# Patient Record
Sex: FEMALE | Race: WHITE | HISPANIC OR LATINO
[De-identification: names, ages, dates, MRNs, and addresses within clinical notes are randomized per-mention and may not be internally consistent; named-entity substitution may affect disease eponyms.]

---

## 2018-10-17 ENCOUNTER — APPOINTMENT (OUTPATIENT)
Dept: INTERNAL MEDICINE | Facility: CLINIC | Age: 68
End: 2018-10-17
Payer: MEDICARE

## 2018-10-17 VITALS
HEART RATE: 84 BPM | TEMPERATURE: 98.2 F | SYSTOLIC BLOOD PRESSURE: 124 MMHG | RESPIRATION RATE: 16 BRPM | WEIGHT: 142 LBS | OXYGEN SATURATION: 96 % | HEIGHT: 60 IN | DIASTOLIC BLOOD PRESSURE: 80 MMHG | BODY MASS INDEX: 27.88 KG/M2

## 2018-10-17 DIAGNOSIS — I10 ESSENTIAL (PRIMARY) HYPERTENSION: ICD-10-CM

## 2018-10-17 DIAGNOSIS — E03.9 HYPOTHYROIDISM, UNSPECIFIED: ICD-10-CM

## 2018-10-17 DIAGNOSIS — E78.00 PURE HYPERCHOLESTEROLEMIA, UNSPECIFIED: ICD-10-CM

## 2018-10-17 DIAGNOSIS — R06.2 WHEEZING: ICD-10-CM

## 2018-10-17 PROCEDURE — 94729 DIFFUSING CAPACITY: CPT

## 2018-10-17 PROCEDURE — 99204 OFFICE O/P NEW MOD 45 MIN: CPT | Mod: 25

## 2018-10-17 PROCEDURE — 94727 GAS DIL/WSHOT DETER LNG VOL: CPT

## 2018-10-17 PROCEDURE — ZZZZZ: CPT

## 2018-10-17 PROCEDURE — 94060 EVALUATION OF WHEEZING: CPT

## 2018-10-17 NOTE — HEALTH RISK ASSESSMENT
[] : No [0] : 2) Feeling down, depressed, or hopeless: Not at all (0) [None] : None [Smoke Detector] : smoke detector [Carbon Monoxide Detector] : carbon monoxide detector [Safety elements used in home] : safety elements used in home [Seat Belt] :  uses seat belt [Sunscreen] : uses sunscreen [ColonoscopyDate] : 10/17/17

## 2018-10-22 ENCOUNTER — FORM ENCOUNTER (OUTPATIENT)
Age: 68
End: 2018-10-22

## 2018-10-23 ENCOUNTER — OUTPATIENT (OUTPATIENT)
Dept: OUTPATIENT SERVICES | Facility: HOSPITAL | Age: 68
LOS: 1 days | End: 2018-10-23
Payer: MEDICARE

## 2018-10-23 ENCOUNTER — APPOINTMENT (OUTPATIENT)
Dept: CT IMAGING | Facility: CLINIC | Age: 68
End: 2018-10-23
Payer: MEDICARE

## 2018-10-23 DIAGNOSIS — Z00.8 ENCOUNTER FOR OTHER GENERAL EXAMINATION: ICD-10-CM

## 2018-10-23 PROCEDURE — 71250 CT THORAX DX C-: CPT | Mod: 26

## 2018-10-23 PROCEDURE — 71250 CT THORAX DX C-: CPT

## 2018-11-01 ENCOUNTER — APPOINTMENT (OUTPATIENT)
Dept: INTERNAL MEDICINE | Facility: CLINIC | Age: 68
End: 2018-11-01
Payer: MEDICARE

## 2018-11-01 VITALS
TEMPERATURE: 99.7 F | BODY MASS INDEX: 28.47 KG/M2 | RESPIRATION RATE: 18 BRPM | DIASTOLIC BLOOD PRESSURE: 82 MMHG | OXYGEN SATURATION: 96 % | HEIGHT: 60 IN | SYSTOLIC BLOOD PRESSURE: 120 MMHG | HEART RATE: 72 BPM | WEIGHT: 145 LBS

## 2018-11-01 DIAGNOSIS — F17.200 NICOTINE DEPENDENCE, UNSPECIFIED, UNCOMPLICATED: ICD-10-CM

## 2018-11-01 DIAGNOSIS — J44.9 CHRONIC OBSTRUCTIVE PULMONARY DISEASE, UNSPECIFIED: ICD-10-CM

## 2018-11-01 PROCEDURE — 99214 OFFICE O/P EST MOD 30 MIN: CPT | Mod: 25

## 2018-11-01 PROCEDURE — 99406 BEHAV CHNG SMOKING 3-10 MIN: CPT

## 2018-11-01 RX ORDER — ROSUVASTATIN CALCIUM 5 MG/1
5 TABLET, FILM COATED ORAL
Refills: 0 | Status: ACTIVE | COMMUNITY

## 2018-11-01 RX ORDER — OLMESARTAN MEDOXOMIL AND HYDROCHLOROTHIAZIDE 40; 25 MG/1; MG/1
40-25 TABLET ORAL
Refills: 0 | Status: ACTIVE | COMMUNITY

## 2018-11-01 RX ORDER — ALBUTEROL SULFATE 90 UG/1
108 (90 BASE) POWDER, METERED RESPIRATORY (INHALATION)
Refills: 0 | Status: DISCONTINUED | COMMUNITY
Start: 2018-10-17 | End: 2018-11-01

## 2018-11-01 RX ORDER — LEVOTHYROXINE SODIUM 0.09 MG/1
88 TABLET ORAL
Refills: 0 | Status: ACTIVE | COMMUNITY

## 2018-11-01 NOTE — ASSESSMENT
[FreeTextEntry1] : #1 history of smoking pipe in the past. Patient was counseled to stop smoking altogether.\par \par #2 clear lungs on recent screening CT scan of chest.\par \par #3 incidentally noted some calcification of coronary arteries and aorta. Patient to see Dr. Salmon her cardiologist soon for a following.\par \par RV pulm appt in 1 year.

## 2018-11-01 NOTE — REVIEW OF SYSTEMS
[Fever] : no fever [Chills] : no chills [Recent Change In Weight] : ~T no recent weight change [Chest Pain] : no chest pain [Palpitations] : no palpitations

## 2018-11-01 NOTE — HISTORY OF PRESENT ILLNESS
[FreeTextEntry1] : f/u after CT chest [de-identified] : This is a pleasant 68-year-old female who returns for a following appointment. Past history of smoking pot.  currently she is not having coughing, wheezing, or dyspnea on exertion.  she is not needing to use her Proair her rescue inhaler recently.  she had a screening CT scan of chest that showed clear lungs, incidentally noted some calcifications of the coronary arteries and the aorta and she will continue to follow Dr. Salmon, her cardiologist closely regarding this.\par \par She is not having any chest pain, or palpitations.\par \par She denies recent fever, chills, weight loss.

## 2021-01-07 ENCOUNTER — OUTPATIENT (OUTPATIENT)
Dept: OUTPATIENT SERVICES | Facility: HOSPITAL | Age: 71
LOS: 1 days | End: 2021-01-07
Payer: MEDICARE

## 2021-01-07 DIAGNOSIS — Z20.828 CONTACT WITH AND (SUSPECTED) EXPOSURE TO OTHER VIRAL COMMUNICABLE DISEASES: ICD-10-CM

## 2021-01-07 LAB — SARS-COV-2 RNA SPEC QL NAA+PROBE: SIGNIFICANT CHANGE UP

## 2021-01-07 PROCEDURE — C9803: CPT

## 2021-01-07 PROCEDURE — U0005: CPT

## 2021-01-07 PROCEDURE — U0003: CPT

## 2021-01-08 DIAGNOSIS — Z20.828 CONTACT WITH AND (SUSPECTED) EXPOSURE TO OTHER VIRAL COMMUNICABLE DISEASES: ICD-10-CM

## 2021-02-15 ENCOUNTER — OUTPATIENT (OUTPATIENT)
Dept: OUTPATIENT SERVICES | Facility: HOSPITAL | Age: 71
LOS: 1 days | End: 2021-02-15
Payer: MEDICARE

## 2021-02-15 DIAGNOSIS — Z20.828 CONTACT WITH AND (SUSPECTED) EXPOSURE TO OTHER VIRAL COMMUNICABLE DISEASES: ICD-10-CM

## 2021-02-15 LAB — SARS-COV-2 RNA SPEC QL NAA+PROBE: SIGNIFICANT CHANGE UP

## 2021-02-15 PROCEDURE — U0005: CPT

## 2021-02-15 PROCEDURE — U0003: CPT

## 2021-02-15 PROCEDURE — C9803: CPT

## 2021-02-16 DIAGNOSIS — Z20.828 CONTACT WITH AND (SUSPECTED) EXPOSURE TO OTHER VIRAL COMMUNICABLE DISEASES: ICD-10-CM

## 2021-10-01 ENCOUNTER — TRANSCRIPTION ENCOUNTER (OUTPATIENT)
Age: 71
End: 2021-10-01

## 2021-10-06 PROBLEM — I10 ESSENTIAL HYPERTENSION: Status: ACTIVE | Noted: 2018-10-17

## 2022-05-11 ENCOUNTER — INPATIENT (INPATIENT)
Facility: HOSPITAL | Age: 72
LOS: 0 days | Discharge: HOME | DRG: 305 | End: 2022-05-12
Attending: HOSPITALIST | Admitting: FAMILY MEDICINE
Payer: MEDICARE

## 2022-05-11 VITALS — WEIGHT: 199.96 LBS

## 2022-05-11 DIAGNOSIS — I10 ESSENTIAL (PRIMARY) HYPERTENSION: ICD-10-CM

## 2022-05-11 LAB
ALBUMIN SERPL ELPH-MCNC: 3.8 G/DL — SIGNIFICANT CHANGE UP (ref 3.3–5)
ALP SERPL-CCNC: 86 U/L — SIGNIFICANT CHANGE UP (ref 40–120)
ALT FLD-CCNC: 62 U/L — SIGNIFICANT CHANGE UP (ref 12–78)
ANION GAP SERPL CALC-SCNC: 6 MMOL/L — SIGNIFICANT CHANGE UP (ref 5–17)
APTT BLD: 28.3 SEC — SIGNIFICANT CHANGE UP (ref 27.5–35.5)
AST SERPL-CCNC: 26 U/L — SIGNIFICANT CHANGE UP (ref 15–37)
BASOPHILS # BLD AUTO: 0.05 K/UL — SIGNIFICANT CHANGE UP (ref 0–0.2)
BASOPHILS NFR BLD AUTO: 0.8 % — SIGNIFICANT CHANGE UP (ref 0–2)
BILIRUB SERPL-MCNC: 0.4 MG/DL — SIGNIFICANT CHANGE UP (ref 0.2–1.2)
BUN SERPL-MCNC: 17 MG/DL — SIGNIFICANT CHANGE UP (ref 7–23)
CALCIUM SERPL-MCNC: 9.1 MG/DL — SIGNIFICANT CHANGE UP (ref 8.5–10.1)
CHLORIDE SERPL-SCNC: 106 MMOL/L — SIGNIFICANT CHANGE UP (ref 96–108)
CO2 SERPL-SCNC: 26 MMOL/L — SIGNIFICANT CHANGE UP (ref 22–31)
CREAT SERPL-MCNC: 0.96 MG/DL — SIGNIFICANT CHANGE UP (ref 0.5–1.3)
EGFR: 62 ML/MIN/1.73M2 — SIGNIFICANT CHANGE UP
EOSINOPHIL # BLD AUTO: 0.14 K/UL — SIGNIFICANT CHANGE UP (ref 0–0.5)
EOSINOPHIL NFR BLD AUTO: 2.4 % — SIGNIFICANT CHANGE UP (ref 0–6)
GLUCOSE SERPL-MCNC: 115 MG/DL — HIGH (ref 70–99)
HCT VFR BLD CALC: 43.4 % — SIGNIFICANT CHANGE UP (ref 34.5–45)
HGB BLD-MCNC: 14.2 G/DL — SIGNIFICANT CHANGE UP (ref 11.5–15.5)
IMM GRANULOCYTES NFR BLD AUTO: 0.3 % — SIGNIFICANT CHANGE UP (ref 0–1.5)
INR BLD: 0.97 RATIO — SIGNIFICANT CHANGE UP (ref 0.88–1.16)
LYMPHOCYTES # BLD AUTO: 1.39 K/UL — SIGNIFICANT CHANGE UP (ref 1–3.3)
LYMPHOCYTES # BLD AUTO: 23.5 % — SIGNIFICANT CHANGE UP (ref 13–44)
MCHC RBC-ENTMCNC: 31.1 PG — SIGNIFICANT CHANGE UP (ref 27–34)
MCHC RBC-ENTMCNC: 32.7 GM/DL — SIGNIFICANT CHANGE UP (ref 32–36)
MCV RBC AUTO: 95 FL — SIGNIFICANT CHANGE UP (ref 80–100)
MONOCYTES # BLD AUTO: 0.26 K/UL — SIGNIFICANT CHANGE UP (ref 0–0.9)
MONOCYTES NFR BLD AUTO: 4.4 % — SIGNIFICANT CHANGE UP (ref 2–14)
NEUTROPHILS # BLD AUTO: 4.05 K/UL — SIGNIFICANT CHANGE UP (ref 1.8–7.4)
NEUTROPHILS NFR BLD AUTO: 68.6 % — SIGNIFICANT CHANGE UP (ref 43–77)
PLATELET # BLD AUTO: 237 K/UL — SIGNIFICANT CHANGE UP (ref 150–400)
POTASSIUM SERPL-MCNC: 3.8 MMOL/L — SIGNIFICANT CHANGE UP (ref 3.5–5.3)
POTASSIUM SERPL-SCNC: 3.8 MMOL/L — SIGNIFICANT CHANGE UP (ref 3.5–5.3)
PROT SERPL-MCNC: 7.9 GM/DL — SIGNIFICANT CHANGE UP (ref 6–8.3)
PROTHROM AB SERPL-ACNC: 11.2 SEC — SIGNIFICANT CHANGE UP (ref 10.5–13.4)
RBC # BLD: 4.57 M/UL — SIGNIFICANT CHANGE UP (ref 3.8–5.2)
RBC # FLD: 12.6 % — SIGNIFICANT CHANGE UP (ref 10.3–14.5)
SARS-COV-2 RNA SPEC QL NAA+PROBE: SIGNIFICANT CHANGE UP
SODIUM SERPL-SCNC: 138 MMOL/L — SIGNIFICANT CHANGE UP (ref 135–145)
TROPONIN I, HIGH SENSITIVITY RESULT: 5.08 NG/L — SIGNIFICANT CHANGE UP
TROPONIN I, HIGH SENSITIVITY RESULT: 7.29 NG/L — SIGNIFICANT CHANGE UP
TSH SERPL-MCNC: 1.91 UU/ML — SIGNIFICANT CHANGE UP (ref 0.34–4.82)
WBC # BLD: 5.91 K/UL — SIGNIFICANT CHANGE UP (ref 3.8–10.5)
WBC # FLD AUTO: 5.91 K/UL — SIGNIFICANT CHANGE UP (ref 3.8–10.5)

## 2022-05-11 PROCEDURE — 99223 1ST HOSP IP/OBS HIGH 75: CPT

## 2022-05-11 PROCEDURE — 86803 HEPATITIS C AB TEST: CPT

## 2022-05-11 PROCEDURE — 70450 CT HEAD/BRAIN W/O DYE: CPT | Mod: 26,MA

## 2022-05-11 PROCEDURE — 84443 ASSAY THYROID STIM HORMONE: CPT

## 2022-05-11 PROCEDURE — 93306 TTE W/DOPPLER COMPLETE: CPT

## 2022-05-11 PROCEDURE — 80061 LIPID PANEL: CPT

## 2022-05-11 PROCEDURE — 93005 ELECTROCARDIOGRAM TRACING: CPT

## 2022-05-11 PROCEDURE — 83036 HEMOGLOBIN GLYCOSYLATED A1C: CPT

## 2022-05-11 PROCEDURE — 84484 ASSAY OF TROPONIN QUANT: CPT

## 2022-05-11 PROCEDURE — 80053 COMPREHEN METABOLIC PANEL: CPT

## 2022-05-11 PROCEDURE — 36415 COLL VENOUS BLD VENIPUNCTURE: CPT

## 2022-05-11 PROCEDURE — 71046 X-RAY EXAM CHEST 2 VIEWS: CPT

## 2022-05-11 PROCEDURE — 85027 COMPLETE CBC AUTOMATED: CPT

## 2022-05-11 PROCEDURE — 93010 ELECTROCARDIOGRAM REPORT: CPT

## 2022-05-11 PROCEDURE — 99285 EMERGENCY DEPT VISIT HI MDM: CPT

## 2022-05-11 PROCEDURE — 93306 TTE W/DOPPLER COMPLETE: CPT | Mod: 26

## 2022-05-11 RX ORDER — ONDANSETRON 8 MG/1
4 TABLET, FILM COATED ORAL EVERY 8 HOURS
Refills: 0 | Status: DISCONTINUED | OUTPATIENT
Start: 2022-05-11 | End: 2022-05-12

## 2022-05-11 RX ORDER — LABETALOL HCL 100 MG
5 TABLET ORAL ONCE
Refills: 0 | Status: COMPLETED | OUTPATIENT
Start: 2022-05-11 | End: 2022-05-11

## 2022-05-11 RX ORDER — LEVOTHYROXINE SODIUM 125 MCG
88 TABLET ORAL DAILY
Refills: 0 | Status: DISCONTINUED | OUTPATIENT
Start: 2022-05-11 | End: 2022-05-12

## 2022-05-11 RX ORDER — LOSARTAN POTASSIUM 100 MG/1
100 TABLET, FILM COATED ORAL DAILY
Refills: 0 | Status: DISCONTINUED | OUTPATIENT
Start: 2022-05-11 | End: 2022-05-12

## 2022-05-11 RX ORDER — AMLODIPINE BESYLATE 2.5 MG/1
5 TABLET ORAL DAILY
Refills: 0 | Status: DISCONTINUED | OUTPATIENT
Start: 2022-05-11 | End: 2022-05-12

## 2022-05-11 RX ORDER — ATORVASTATIN CALCIUM 80 MG/1
20 TABLET, FILM COATED ORAL AT BEDTIME
Refills: 0 | Status: DISCONTINUED | OUTPATIENT
Start: 2022-05-11 | End: 2022-05-12

## 2022-05-11 RX ORDER — HYDRALAZINE HCL 50 MG
10 TABLET ORAL EVERY 6 HOURS
Refills: 0 | Status: DISCONTINUED | OUTPATIENT
Start: 2022-05-11 | End: 2022-05-12

## 2022-05-11 RX ORDER — ACETAMINOPHEN 500 MG
650 TABLET ORAL EVERY 6 HOURS
Refills: 0 | Status: DISCONTINUED | OUTPATIENT
Start: 2022-05-11 | End: 2022-05-12

## 2022-05-11 RX ORDER — LANOLIN ALCOHOL/MO/W.PET/CERES
3 CREAM (GRAM) TOPICAL AT BEDTIME
Refills: 0 | Status: DISCONTINUED | OUTPATIENT
Start: 2022-05-11 | End: 2022-05-12

## 2022-05-11 RX ADMIN — LOSARTAN POTASSIUM 100 MILLIGRAM(S): 100 TABLET, FILM COATED ORAL at 13:19

## 2022-05-11 RX ADMIN — AMLODIPINE BESYLATE 5 MILLIGRAM(S): 2.5 TABLET ORAL at 13:19

## 2022-05-11 RX ADMIN — Medication 650 MILLIGRAM(S): at 19:50

## 2022-05-11 RX ADMIN — ATORVASTATIN CALCIUM 20 MILLIGRAM(S): 80 TABLET, FILM COATED ORAL at 21:07

## 2022-05-11 RX ADMIN — Medication 650 MILLIGRAM(S): at 13:19

## 2022-05-11 RX ADMIN — Medication 88 MICROGRAM(S): at 13:20

## 2022-05-11 RX ADMIN — Medication 650 MILLIGRAM(S): at 20:20

## 2022-05-11 RX ADMIN — Medication 5 MILLIGRAM(S): at 11:15

## 2022-05-11 NOTE — PHARMACOTHERAPY INTERVENTION NOTE - COMMENTS
Medication history complete, but unable to confirm any medications with patient or family.   Reviewed medication with patient and  at bedside.  Patient and  are unsure of names of medication that patient takes at home, patient hasn't taken medication for at least 3 weeks.  Patients  has a list that is not up to date and they are unable to locate the bottles of medication.

## 2022-05-11 NOTE — ED ADULT TRIAGE NOTE - CHIEF COMPLAINT QUOTE
pt presents to ED due to complaints of dizziness with HA since yesterday intermittently pt has hx of HTN states she stopped her BP meds 3 weeks ago abruptly + vomiting this AM denies unilateral  weakness  -BEFAST

## 2022-05-11 NOTE — H&P ADULT - NSHPPHYSICALEXAM_GEN_ALL_CORE
VITALS:  T(F): 98.6 (05-11-22 @ 11:12), Max: 98.6 (05-11-22 @ 11:12)  HR: 70 (05-11-22 @ 12:00) (63 - 70)  BP: 179/103 (05-11-22 @ 12:00) (171/98 - 216/101)  RR: 20 (05-11-22 @ 11:15) (20 - 23)  SpO2: 97% (05-11-22 @ 11:15) (94% - 97%)    PHYSICAL EXAM:  GEN: NAD  HEENT:  pupils equal and reactive, EOMI, no oropharyngeal lesions, erythema, exudates, oral thrush  NECK:   supple, no carotid bruits, no palpable lymph nodes, no thyromegaly  CV:  +S1, +S2, regular, no murmurs or rubs  RESP:   lungs clear to auscultation bilaterally, no wheezing, rales, rhonchi, good air entry bilaterally  BREAST:  not examined  GI:  abdomen soft, non-tender, non-distended, normal BS, no bruits, no abdominal masses, no palpable masses  RECTAL:  not examined  :  not examined  MSK:   normal muscle tone, no atrophy, no rigidity, no contractions  EXT:  no clubbing, no cyanosis, no edema, no calf pain, swelling or erythema  VASCULAR:  pulses equal and symmetric in the upper and lower extremities  NEURO:  AAOX3, no focal neurological deficits, follows all commands, able to move extremities spontaneously  SKIN:  no ulcers, lesions or rashes

## 2022-05-11 NOTE — ED PROVIDER NOTE - OBJECTIVE STATEMENT
70 y/o female with a PMHx of HTN, HLD, COPD, hypothyroid presents to the ED with headache and dizziness. Pt has not taking medications x3 weeks. Pt I sunder a lot of stress. Pt endorses this headache is different. +Nausea and vomiting today. No fever, no chest pain, no SOB. Cardio: Dr. Salmon. PMD: Dr. Batista. Pt denies smoking but has a hx of nicotine dependence.

## 2022-05-11 NOTE — H&P ADULT - HISTORY OF PRESENT ILLNESS
70 y/o female with a PMHx of HTN, HLD, COPD, hypothyroid presents to the ED with headache and dizziness. Patient reports that she has been under a lot of stress   at home recently and stopped taking all of her medications. Yesterday she started developing a headache and blurry vision. She denies any chest pain, sob, nausea  or vomiting. Her headache progressed in severity prompting her to come to the ER.  72 y/o female with a PMHx of HTN, HLD, COPD, hypothyroid presents to the ED with headache and dizziness. Patient reports that she has been under a lot of stress   at home recently and stopped taking all of her medications. Yesterday she started developing a headache and blurry vision. She denies any chest pain, sob, but + nausea  and vomiting today. Her headache progressed in severity prompting her to come to the ER.

## 2022-05-11 NOTE — PATIENT PROFILE ADULT - FALL HARM RISK - HARM RISK INTERVENTIONS

## 2022-05-11 NOTE — H&P ADULT - NSHPLABSRESULTS_GEN_ALL_CORE
LABS:                            14.2   5.91  )-----------( 237      ( 11 May 2022 10:37 )             43.4     05-11    138  |  106  |  17  ----------------------------<  115<H>  3.8   |  26  |  0.96    Ca    9.1      11 May 2022 10:37    TPro  7.9  /  Alb  3.8  /  TBili  0.4  /  DBili  x   /  AST  26  /  ALT  62  /  AlkPhos  86  05-11        LIVER FUNCTIONS - ( 11 May 2022 10:37 )  Alb: 3.8 g/dL / Pro: 7.9 gm/dL / ALK PHOS: 86 U/L / ALT: 62 U/L / AST: 26 U/L / GGT: x           PT/INR - ( 11 May 2022 10:37 )   PT: 11.2 sec;   INR: 0.97 ratio     PTT - ( 11 May 2022 10:37 )  PTT:28.3 sec    EKG: NSR, No acute changes    Radiology:     CT Head No Cont (05.11.22 @ 10:53) >      IMPRESSION:  No acute intracranial hemorrhage or acute territorial infarct.  If   symptoms persist, follow-up MRI exam recommended    --- End of Report ---

## 2022-05-11 NOTE — ED PROVIDER NOTE - NSICDXPASTMEDICALHX_GEN_ALL_CORE_FT
PAST MEDICAL HISTORY:  HTN (hypertension)       Chronic obstructive pulmonary disease (COPD)     HLD (hyperlipidemia)     Hypothyroid

## 2022-05-11 NOTE — ED ADULT NURSE NOTE - OBJECTIVE STATEMENT
Pt comes to ED with c/o headache and dizziness that started yesterday. Per pt shes been under an immense amount of stress which has caused pt to neglect herself. Pt unsure what medications she takes. Denies chest pain, SOB, fevers.

## 2022-05-11 NOTE — H&P ADULT - ASSESSMENT
70 y/o female with a PMHx of HTN, HLD, COPD, hypothyroid presents to the ED with headache and dizziness.     # Hypertensive emergency  - Admit to telemetry  - Olmesartan 40  - Amlodipine 5  - Gradual lowering of blood pressure  - Hydralazine 10 prn for SBP > 220/110  - Check Echo  - EKG in am  - Recheck Troponin  - Cardiology eval Dr. Salmon  - check TSH    # Hypothyroid  - Levothyroxine 88 mcg  - Check TSH    # HLD  - Atorvastatin  - Check FLP    # DVT prophylaxis  - Ambulation, low risk  70 y/o female with a PMHx of HTN, HLD, COPD, hypothyroid presents to the ED with headache and dizziness.     # Hypertensive emergency  - Emergency secondary to blurry vision  - Admit to telemetry  - Losartan 100  - Start Amlodipine 5  - Gradual lowering of blood pressure  - Hydralazine 10 prn for SBP > 220/110  - Check Echo  - EKG in am  - Recheck Troponin  - Cardiology eval Dr. Salmon  - check TSH    # Hypothyroid  - Levothyroxine 88 mcg  - Check TSH    # HLD  - Atorvastatin  - Check FLP    # DVT prophylaxis  - Ambulation, low risk  72 y/o female with a PMHx of HTN, HLD, COPD, hypothyroid presents to the ED with headache and dizziness.     # Hypertensive emergency  - Emergency secondary to blurry vision  - CT head negative  - Admit to telemetry  - Losartan 100  - Start Amlodipine 5  - Gradual lowering of blood pressure  - Hydralazine 10 prn for SBP > 220/110  - Check Echo  - EKG in am  - Recheck Troponin  - Cardiology eval Dr. Salmon  - check TSH    # Hypothyroid  - Levothyroxine 88 mcg  - Check TSH    # HLD  - Atorvastatin  - Check FLP    # DVT prophylaxis  - Ambulation, low risk

## 2022-05-11 NOTE — ED STATDOCS - PROGRESS NOTE DETAILS
Matthew Mauricio for attending Dr. Ca: 72 y/o female with a PMHx of HTN presents to the ED c/o HA and dizziness since yesterday. Pt has not taken her HTN meds. +n/v. No other complaints at this time. Will send pt to main ED for further evaluation. Matthew Mauricio for attending Dr. Ca: 70 y/o female with a PMHx of HTN presents to the ED c/o HA and dizziness since yesterday. Pt has not taken her HTN meds. +n/v. No other complaints at this time. Pt with NIHSS 0 currently however pt hypertensive with HA and difficulty ambulating. Will send pt to main ED for further evaluation.

## 2022-05-12 ENCOUNTER — TRANSCRIPTION ENCOUNTER (OUTPATIENT)
Age: 72
End: 2022-05-12

## 2022-05-12 VITALS — HEART RATE: 92 BPM | DIASTOLIC BLOOD PRESSURE: 73 MMHG | SYSTOLIC BLOOD PRESSURE: 125 MMHG

## 2022-05-12 LAB
A1C WITH ESTIMATED AVERAGE GLUCOSE RESULT: 5.3 % — SIGNIFICANT CHANGE UP (ref 4–5.6)
ALBUMIN SERPL ELPH-MCNC: 3.7 G/DL — SIGNIFICANT CHANGE UP (ref 3.3–5)
ALP SERPL-CCNC: 80 U/L — SIGNIFICANT CHANGE UP (ref 40–120)
ALT FLD-CCNC: 32 U/L — SIGNIFICANT CHANGE UP (ref 12–78)
ANION GAP SERPL CALC-SCNC: 9 MMOL/L — SIGNIFICANT CHANGE UP (ref 5–17)
AST SERPL-CCNC: 19 U/L — SIGNIFICANT CHANGE UP (ref 15–37)
BILIRUB SERPL-MCNC: 0.5 MG/DL — SIGNIFICANT CHANGE UP (ref 0.2–1.2)
BUN SERPL-MCNC: 22 MG/DL — SIGNIFICANT CHANGE UP (ref 7–23)
CALCIUM SERPL-MCNC: 9.6 MG/DL — SIGNIFICANT CHANGE UP (ref 8.5–10.1)
CHLORIDE SERPL-SCNC: 106 MMOL/L — SIGNIFICANT CHANGE UP (ref 96–108)
CHOLEST SERPL-MCNC: 282 MG/DL — HIGH
CO2 SERPL-SCNC: 25 MMOL/L — SIGNIFICANT CHANGE UP (ref 22–31)
CREAT SERPL-MCNC: 0.96 MG/DL — SIGNIFICANT CHANGE UP (ref 0.5–1.3)
EGFR: 62 ML/MIN/1.73M2 — SIGNIFICANT CHANGE UP
ESTIMATED AVERAGE GLUCOSE: 105 MG/DL — SIGNIFICANT CHANGE UP (ref 68–114)
GLUCOSE SERPL-MCNC: 152 MG/DL — HIGH (ref 70–99)
HCT VFR BLD CALC: 41.4 % — SIGNIFICANT CHANGE UP (ref 34.5–45)
HCV AB S/CO SERPL IA: 0.25 S/CO — SIGNIFICANT CHANGE UP (ref 0–0.99)
HCV AB SERPL-IMP: SIGNIFICANT CHANGE UP
HDLC SERPL-MCNC: 51 MG/DL — SIGNIFICANT CHANGE UP
HGB BLD-MCNC: 13.7 G/DL — SIGNIFICANT CHANGE UP (ref 11.5–15.5)
LIPID PNL WITH DIRECT LDL SERPL: 195 MG/DL — HIGH
MCHC RBC-ENTMCNC: 30.6 PG — SIGNIFICANT CHANGE UP (ref 27–34)
MCHC RBC-ENTMCNC: 33.1 GM/DL — SIGNIFICANT CHANGE UP (ref 32–36)
MCV RBC AUTO: 92.4 FL — SIGNIFICANT CHANGE UP (ref 80–100)
NON HDL CHOLESTEROL: 231 MG/DL — HIGH
PLATELET # BLD AUTO: 220 K/UL — SIGNIFICANT CHANGE UP (ref 150–400)
POTASSIUM SERPL-MCNC: 3.4 MMOL/L — LOW (ref 3.5–5.3)
POTASSIUM SERPL-SCNC: 3.4 MMOL/L — LOW (ref 3.5–5.3)
PROT SERPL-MCNC: 7.4 GM/DL — SIGNIFICANT CHANGE UP (ref 6–8.3)
RBC # BLD: 4.48 M/UL — SIGNIFICANT CHANGE UP (ref 3.8–5.2)
RBC # FLD: 12.7 % — SIGNIFICANT CHANGE UP (ref 10.3–14.5)
SODIUM SERPL-SCNC: 140 MMOL/L — SIGNIFICANT CHANGE UP (ref 135–145)
TRIGL SERPL-MCNC: 179 MG/DL — HIGH
WBC # BLD: 8.96 K/UL — SIGNIFICANT CHANGE UP (ref 3.8–10.5)
WBC # FLD AUTO: 8.96 K/UL — SIGNIFICANT CHANGE UP (ref 3.8–10.5)

## 2022-05-12 PROCEDURE — 93010 ELECTROCARDIOGRAM REPORT: CPT

## 2022-05-12 PROCEDURE — 99239 HOSP IP/OBS DSCHRG MGMT >30: CPT

## 2022-05-12 PROCEDURE — 71046 X-RAY EXAM CHEST 2 VIEWS: CPT | Mod: 26

## 2022-05-12 RX ORDER — ASPIRIN/CALCIUM CARB/MAGNESIUM 324 MG
1 TABLET ORAL
Qty: 30 | Refills: 3
Start: 2022-05-12

## 2022-05-12 RX ORDER — POTASSIUM CHLORIDE 20 MEQ
40 PACKET (EA) ORAL EVERY 4 HOURS
Refills: 0 | Status: DISCONTINUED | OUTPATIENT
Start: 2022-05-12 | End: 2022-05-12

## 2022-05-12 RX ORDER — NIFEDIPINE 30 MG
1 TABLET, EXTENDED RELEASE 24 HR ORAL
Qty: 30 | Refills: 3
Start: 2022-05-12

## 2022-05-12 RX ORDER — HYDROCHLOROTHIAZIDE 25 MG
25 TABLET ORAL DAILY
Refills: 0 | Status: DISCONTINUED | OUTPATIENT
Start: 2022-05-12 | End: 2022-05-12

## 2022-05-12 RX ORDER — CHLORHEXIDINE GLUCONATE 213 G/1000ML
1 SOLUTION TOPICAL
Refills: 0 | Status: DISCONTINUED | OUTPATIENT
Start: 2022-05-12 | End: 2022-05-12

## 2022-05-12 RX ORDER — LEVOTHYROXINE SODIUM 125 MCG
1 TABLET ORAL
Qty: 30 | Refills: 3
Start: 2022-05-12

## 2022-05-12 RX ORDER — VALSARTAN 80 MG/1
1 TABLET ORAL
Qty: 30 | Refills: 3
Start: 2022-05-12

## 2022-05-12 RX ORDER — HYDRALAZINE HCL 50 MG
10 TABLET ORAL EVERY 6 HOURS
Refills: 0 | Status: DISCONTINUED | OUTPATIENT
Start: 2022-05-12 | End: 2022-05-12

## 2022-05-12 RX ORDER — ROSUVASTATIN CALCIUM 5 MG/1
1 TABLET ORAL
Qty: 0 | Refills: 0 | DISCHARGE

## 2022-05-12 RX ORDER — NIFEDIPINE 30 MG
60 TABLET, EXTENDED RELEASE 24 HR ORAL DAILY
Refills: 0 | Status: DISCONTINUED | OUTPATIENT
Start: 2022-05-12 | End: 2022-05-12

## 2022-05-12 RX ORDER — AMLODIPINE BESYLATE 2.5 MG/1
10 TABLET ORAL DAILY
Refills: 0 | Status: DISCONTINUED | OUTPATIENT
Start: 2022-05-12 | End: 2022-05-12

## 2022-05-12 RX ORDER — EZETIMIBE 10 MG/1
1 TABLET ORAL
Qty: 30 | Refills: 3
Start: 2022-05-12

## 2022-05-12 RX ORDER — AMLODIPINE BESYLATE 2.5 MG/1
1 TABLET ORAL
Qty: 0 | Refills: 0 | DISCHARGE

## 2022-05-12 RX ORDER — ROSUVASTATIN CALCIUM 5 MG/1
1 TABLET ORAL
Qty: 30 | Refills: 3
Start: 2022-05-12

## 2022-05-12 RX ORDER — VALSARTAN 80 MG/1
320 TABLET ORAL DAILY
Refills: 0 | Status: DISCONTINUED | OUTPATIENT
Start: 2022-05-12 | End: 2022-05-12

## 2022-05-12 RX ORDER — EZETIMIBE 10 MG/1
1 TABLET ORAL
Qty: 0 | Refills: 0 | DISCHARGE

## 2022-05-12 RX ORDER — LEVOTHYROXINE SODIUM 125 MCG
1 TABLET ORAL
Qty: 0 | Refills: 0 | DISCHARGE

## 2022-05-12 RX ORDER — OLMESARTAN MEDOXOMIL-HYDROCHLOROTHIAZIDE 25; 40 MG/1; MG/1
1 TABLET, FILM COATED ORAL
Qty: 0 | Refills: 0 | DISCHARGE

## 2022-05-12 RX ORDER — POTASSIUM CHLORIDE 20 MEQ
40 PACKET (EA) ORAL EVERY 4 HOURS
Refills: 0 | Status: COMPLETED | OUTPATIENT
Start: 2022-05-12 | End: 2022-05-12

## 2022-05-12 RX ORDER — HYDRALAZINE HCL 50 MG
5 TABLET ORAL ONCE
Refills: 0 | Status: COMPLETED | OUTPATIENT
Start: 2022-05-12 | End: 2022-05-12

## 2022-05-12 RX ORDER — ASPIRIN/CALCIUM CARB/MAGNESIUM 324 MG
1 TABLET ORAL
Qty: 0 | Refills: 0 | DISCHARGE

## 2022-05-12 RX ADMIN — Medication 650 MILLIGRAM(S): at 05:12

## 2022-05-12 RX ADMIN — Medication 5 MILLIGRAM(S): at 05:21

## 2022-05-12 RX ADMIN — VALSARTAN 320 MILLIGRAM(S): 80 TABLET ORAL at 09:24

## 2022-05-12 RX ADMIN — Medication 60 MILLIGRAM(S): at 09:24

## 2022-05-12 RX ADMIN — Medication 40 MILLIEQUIVALENT(S): at 12:43

## 2022-05-12 RX ADMIN — AMLODIPINE BESYLATE 10 MILLIGRAM(S): 2.5 TABLET ORAL at 06:55

## 2022-05-12 RX ADMIN — Medication 88 MICROGRAM(S): at 05:14

## 2022-05-12 RX ADMIN — ONDANSETRON 4 MILLIGRAM(S): 8 TABLET, FILM COATED ORAL at 05:21

## 2022-05-12 RX ADMIN — Medication 25 MILLIGRAM(S): at 07:20

## 2022-05-12 NOTE — DISCHARGE NOTE PROVIDER - HOSPITAL COURSE
71 year-old woman with HTN, HLD, COPD, hypothyroidism, presented 5/11 with headache and blurry vision in setting of markedly elevated blood pressures and home life stressors, had been focused on caring for her ailing mother and not keeping up with her own medications. In the ED, /101. HR, RR, O2 sat WNL. Neurologically intact on exam. Clear lungs. Normal cardiac auscultation. No JVD. CBC, chemistry normal. Troponin negative. EKG with lateral T wave inversions and LVH. CT head with chronic L thalamic lacune, microvascular ischemic changes. CXR clear. Patient given antihypertensive medication and admitted to Medicine.    Hypertensive emergency  Patient resumed on BP medications, currently Diovan, HTCTZ and Procardia XL. BP now improved. Symptoms resolved. TTE done, normal LVEF, no regional wall motion abnormalities and no significant valvular disease. There was diastolic dysfunction. Patient stable for discharge home on Diovan, HCTZ and Procardia XL. Close outpatient Primary Care and/or Cardiology follow up for BP check, further medication adjustment, if needed, and possibly an outpatient ischemic evaluation if indicated. Will defer to Dr. Salmon.      HLD  Chronic, stable. Continue Zetia and Crestor.     COPD  Chronic, stable. Breathing comfortably on room air.     Hypothyroidism  Chronic, stable. TSH WNL. Continue levothyroxine.    Hypokalemia  K 3.4. Given potassium supplementation.       Discharge Exam:  Afebrile  BP 120s-150s/70s  HR 70s-90s  RR 16-18  O2 87-99% on room air  Gen: Comfortable appearing  HEENT: NCAT PERRL EOMI  Neck Supple, no JVD  Chest: CTA B/L, normal respiratory effort  CVS: S1 S2 normal RRR  Abd: +BS, soft NT ND   Ext: No edema or calf tenderness  Skin: Warm, dry, intact  Neuro: AAOx3, speech fluent, no aphasia, repetition and naming intact, CN intact, strength 5/5 throughout, no drift, no sensation full, FNF intact, gait steady  Mood: calm, pleasant    Labs:                       13.7   8.96 )-----------( 220             41.4     140  |  106  |  22  ----------------------< 152  3.4   |   25   |  0.96    Ca 9.6    TPro  7.4  /  Alb  3.7  /  TBili  0.5  /  DBili  x   /  AST  19  /  ALT  32  /  AlkPhos  80      A1c 5.3      Troponin (-) x 2   TSH 1.91 (WNL)    Micro:  COVID19 PCR 5/11: Negative    Imaging:  CT head 5/11: Areas of low attenuation in the periventricular white matter likely related to mild chronic microvascular ischemic changes. Chronic lacunar infarct left thalamus. There is no acute intracranial hemorrhage, parenchymal mass, mass effect or midline shift. There is no acute territorial infarct. There is no hydrocephalus. The cranium is intact. Mild mucosal thickening paranasal sinuses.    CXR 5/12: Normal cardiac silhouette and normal pulmonary vasculature with no consolidation, effusion, pneumothorax or acute osseous finding.    Cardiac Testing:  EKG 5/12: NSR, rate WNL, TWI laterally, LVH    TTE 5/11: The left ventricle is normal in size, wall thickness, wall motion and contractility. Estimated left ventricular ejection fraction is 65-70%. Normal appearing left atrium. Mild aortic sclerosis is present with normal valvular opening. The mitral valve leaflets appear thickened. Trace mitral regurgitation is present. EA reversal of the mitral inflow consistent with reduced compliance of the left ventricle. The tricuspid valve leaflets are thin and pliable; valve motion is normal. Mild (1+) tricuspid valve regurgitation is present.    EKG 5/11: NSR, rate WNL, TWI laterally, LVH

## 2022-05-12 NOTE — DISCHARGE NOTE NURSING/CASE MANAGEMENT/SOCIAL WORK - PATIENT PORTAL LINK FT
You can access the FollowMyHealth Patient Portal offered by [facility] by registering at the following website: [email or web address]. By joining [email or web address] FollowMyHealth portal, you will also be able to view your health information using other applications (apps) compatible with our system.

## 2022-05-12 NOTE — DISCHARGE NOTE NURSING/CASE MANAGEMENT/SOCIAL WORK - NSDCPEFALRISK_GEN_ALL_CORE
For information on Fall & Injury Prevention, visit: [email or web address] OR  [email or web address] OR  https://www.cdc.gov/steadi/patient.html

## 2022-05-12 NOTE — DISCHARGE NOTE PROVIDER - NSDCCPCAREPLAN_GEN_ALL_CORE_FT
PRINCIPAL DISCHARGE DIAGNOSIS  Diagnosis: Hypertensive emergency  Assessment and Plan of Treatment: You were admitted to the hospital for headache, dizziness and blurry vision due to markedly elevated blood pressures. You were resumed on blood pressure medicine, currently valsartan, hydrochlorothiazide and nifedipine XL. Blood pressure is now much improved and symptoms have resolved. Cat scan of the head was done and while it did show a tiny old stroke (the kind due to high blood pressures), there was no sign of new or recent stroke nor other significant findings. An echocardiogram was done and showed normal heart systolic (squeezing) function and no significant valvular disease. No focal wall motion abnormalities of the heart to suggest a heart attack. You are stable for discharge home on the regimen of valsartan, hydrochlorothiazide, and nifedipine, along with baby aspirin and your prior cholesterol medications. Please follow up closely with outpatient Primary Care and/or Cardiology for blood pressure check, further medication adjustment, if needed, and possibly an outpatient ischemic evaluation if indicated. Will defer to Dr. Salmon on the latter. He requests you make an appointment with his office for a visit next week. Return to the hospital if your headache, dizziness, blurry vision re-occurs or if you develop shortness of breath, chest pain or tightness, or other concerning complaints.      SECONDARY DISCHARGE DIAGNOSES  Diagnosis: Hyperlipidemia  Assessment and Plan of Treatment: Continue your home regimen of ezetimibe and rosuvastatin to help lower your cholesterol.    Diagnosis: Hypothyroidism  Assessment and Plan of Treatment: Thyroid-stimulating hormone was checked this admission, normal. Continue your levothyroxine.

## 2022-05-12 NOTE — DISCHARGE NOTE PROVIDER - CARE PROVIDER_API CALL
Alejandra Batista (MD)  [facility]  [address]  [place] [address]  Phone: [phone]  Fax: [phone]  Follow Up Time: 1 week    Jerry Salmon  CARDIOVASCULAR DISEASE  [address], [address]  [place] [address]  Phone: [phone]  Fax: [phone]  Follow Up Time: 1 week

## 2022-05-12 NOTE — CONSULT NOTE ADULT - ASSESSMENT
71 year old woman with the above history admitted with the above symptom complex and significant hypertension.  No CT evidence for any neurological process.  No evidence for ACS.  Will increase amlodipine to 10, change losartan to valsartan and add HCTZ.  Hydralazine 10 mg IVP prn.  Will order a TTE. 71 year old woman with the above history admitted with the above symptom complex and significant hypertension.  No CT evidence for any neurological process.  No evidence for ACS.  Will increase amlodipine to 10, change losartan to valsartan and add HCTZ.  Hydralazine 10 mg IVP prn.  Will follow up with TTE result.

## 2022-05-12 NOTE — CONSULT NOTE ADULT - SUBJECTIVE AND OBJECTIVE BOX
CHIEF COMPLAINT: Patient is a 71y old  Female who presents with a chief complaint of Dizziness (11 May 2022 12:48)      HPI: HPI:  70 y/o female with a PMHx of HTN, HLD, COPD, hypothyroid presents to the ED with headache and dizziness. Patient reports that she has been under a lot of stress   at home recently and stopped taking all of her medications. Yesterday she started developing a headache and blurry vision. She denies any chest pain, sob, but + nausea  and vomiting today. Her headache progressed in severity prompting her to come to the ER.   (11 May 2022 12:48)      PMHx: PAST MEDICAL & SURGICAL HISTORY:  HTN (hypertension)            Soc Hx:     FAMILY HISTORY:      Allergies: Allergies    No Known Allergies    Intolerances          REVIEW OF SYSTEMS:    As above  No chest pain or shortness of breath  No lightheadeness or syncope  No leg swelling  No palpitations  No claudication-like symptoms    Vital Signs Last 24 Hrs  T(C): 36.5 (11 May 2022 19:15), Max: 37 (11 May 2022 11:12)  T(F): 97.7 (11 May 2022 19:15), Max: 98.6 (11 May 2022 11:12)  HR: 73 (12 May 2022 06:37) (63 - 87)  BP: 182/94 (12 May 2022 06:37) (142/76 - 216/101)  BP(mean): 123 (11 May 2022 11:12) (123 - 131)  RR: 20 (12 May 2022 04:57) (18 - 23)  SpO2: 100% (12 May 2022 04:57) (94% - 100%)    I&O's Summary      CAPILLARY BLOOD GLUCOSE          PHYSICAL EXAM:   Patient in NAD  Neck: No JVD; Carotids:  2+ without bruits  Respiratory:  Clear to A&P  Cardiovascular: S1 and S2, regular rate and rhythm, no Murmurs, gallops or rubs  Gastrointestinal:  Soft, non-tender; BS positive  Extremities: No peripheral edema  Vascular: 2+ peripheral pulses  Neurological: A/O x 3, no focal deficits      MEDICATIONS:  MEDICATIONS  (STANDING):  amLODIPine   Tablet 10 milliGRAM(s) Oral daily  atorvastatin 20 milliGRAM(s) Oral at bedtime  levothyroxine 88 MICROGram(s) Oral daily  valsartan 320 milliGRAM(s) Oral daily      LABS: All Labs Reviewed:  Blood Culture:   BNP   CBC             WBC Count: 5.91 K/uL (05-11 @ 10:37)              Hemoglobin: 14.2 g/dL (05-11 @ 10:37)              Hematocrit: 43.4 % (05-11 @ 10:37)              Mean Cell Volume: 95.0 fl (05-11 @ 10:37)              Platelet Count - Automated: 237 K/uL (05-11 @ 10:37)                            Cardiac markers   Troponin I, High Sensitivity (05.11.22 @ 10:37) Troponin I, High Sensitivity Result: 5.08  Troponin I, High Sensitivity (05.11.22 @ 16:47) Troponin I, High Sensitivity Result: 7.29                                     Chems        Sodium, Serum: 138 mmol/L (05-11 @ 10:37)          Potassium, Serum: 3.8 mmol/L (05-11 @ 10:37)          Blood Urea Nitrogen, Serum: 17 mg/dL (05-11 @ 10:37)          Creatinine 0.96                  Protein Total, Serum: 7.9 gm/dL (05-11 @ 10:37)                  Calcium, Total Serum: 9.1 mg/dL (05-11 @ 10:37)                  Bilirubin Total, Serum: 0.4 mg/dL (05-11 @ 10:37)          Alanine Aminotransferase (ALT/SGPT): 62 U/L (05-11 @ 10:37)          Aspartate Aminotransferase (AST/SGOT): 26 U/L (05-11 @ 10:37)                 INR: 0.97 ratio (05-11 @ 10:37)             RADIOLOGY: < from: CT Head No Cont (05.11.22 @ 10:53) >  IMPRESSION:  No acute intracranial hemorrhage or acute territorial infarct.  If   symptoms persist, follow-up MRI exam recommended      < end of copied text >      EKG:  NSR; LVH voltage; diffuse mild t changes    Telemetry:  Sinus    ECHO:

## 2022-05-12 NOTE — DISCHARGE NOTE PROVIDER - PROVIDER TOKENS
PROVIDER:[TOKEN:[[id number]],FOLLOWUP:[1 week]],PROVIDER:[TOKEN:[[id number]],FOLLOWUP:[1 week]]

## 2022-05-12 NOTE — PROVIDER CONTACT NOTE (OTHER) - SITUATION
called ans service - spoke to Aguila
pt awake sitting on edge of bed complaining of headache, nausea, and dizziness

## 2022-05-12 NOTE — DISCHARGE NOTE PROVIDER - NSDCMRMEDTOKEN_GEN_ALL_CORE_FT
Aspirin Enteric Coated 81 mg oral delayed release tablet: 1 tab(s) orally once a day   ezetimibe 10 mg oral tablet: 1 tab(s) orally once a day  hydroCHLOROthiazide 25 mg oral tablet: 1 tab(s) orally once a day  levothyroxine 88 mcg (0.088 mg) oral tablet: 1 tab(s) orally once a day  NIFEdipine 60 mg oral tablet, extended release: 1 tab(s) orally once a day  rosuvastatin 5 mg oral tablet: 1 tab(s) orally once a day  valsartan 320 mg oral tablet: 1 tab(s) orally once a day

## 2022-05-12 NOTE — DISCHARGE NOTE PROVIDER - NSDCCAREPROVSEEN_GEN_ALL_CORE_FT
Jerry Salmon (Cardiology)  Keith Crawford ([facility])  Lonny Mcintyre ([facility])  Jonnathan Roland ([facility])

## 2022-05-17 DIAGNOSIS — Z79.899 OTHER LONG TERM (CURRENT) DRUG THERAPY: ICD-10-CM

## 2022-05-17 DIAGNOSIS — Z91.14 PATIENT'S OTHER NONCOMPLIANCE WITH MEDICATION REGIMEN: ICD-10-CM

## 2022-05-17 DIAGNOSIS — Z79.82 LONG TERM (CURRENT) USE OF ASPIRIN: ICD-10-CM

## 2022-05-17 DIAGNOSIS — I16.1 HYPERTENSIVE EMERGENCY: ICD-10-CM

## 2022-05-17 DIAGNOSIS — E87.6 HYPOKALEMIA: ICD-10-CM

## 2022-05-17 DIAGNOSIS — I10 ESSENTIAL (PRIMARY) HYPERTENSION: ICD-10-CM

## 2022-05-17 DIAGNOSIS — J44.9 CHRONIC OBSTRUCTIVE PULMONARY DISEASE, UNSPECIFIED: ICD-10-CM

## 2022-05-17 DIAGNOSIS — Z79.890 HORMONE REPLACEMENT THERAPY: ICD-10-CM

## 2022-05-17 DIAGNOSIS — Z87.891 PERSONAL HISTORY OF NICOTINE DEPENDENCE: ICD-10-CM

## 2022-05-17 DIAGNOSIS — E03.9 HYPOTHYROIDISM, UNSPECIFIED: ICD-10-CM
